# Patient Record
Sex: FEMALE
[De-identification: names, ages, dates, MRNs, and addresses within clinical notes are randomized per-mention and may not be internally consistent; named-entity substitution may affect disease eponyms.]

---

## 2018-09-07 ENCOUNTER — HOSPITAL ENCOUNTER (OUTPATIENT)
Dept: HOSPITAL 88 - CT | Age: 53
End: 2018-09-07
Attending: FAMILY MEDICINE
Payer: COMMERCIAL

## 2018-09-07 DIAGNOSIS — R17: ICD-10-CM

## 2018-09-07 DIAGNOSIS — R10.11: Primary | ICD-10-CM

## 2018-09-07 DIAGNOSIS — R74.8: ICD-10-CM

## 2018-09-07 PROCEDURE — 74177 CT ABD & PELVIS W/CONTRAST: CPT

## 2018-09-07 NOTE — DIAGNOSTIC IMAGING REPORT
EXAMINATION: CT of the abdomen and pelvis with contrast.



TECHNIQUE: 

Spiral CT images of the abdomen and pelvis were performed from the lung bases

to the lesser trochanters after the intravenous administration of 100 cc of

Isovue-370 and the oral administration of water.  Coronal and sagittal

reformatted images were obtained.



COMPARISON:  None.



CLINICAL HISTORY:Jaundice, right upper quadrant pain, elevated liver function

tests

     

DISCUSSION:



ABDOMEN/PELVIS:



LOWER THORAX:Bibasilar linear opacity compatible with subsegmental atelectasis.

Focal scar in the inferior lingular and medial segment of the left lower lobe.

Trace left pleural effusion.



HEPATOBILIARY: Hepatic parenchyma is diffusely hypoattenuating compatible with

steatosis. No focal hepatic lesion or intrahepatic biliary ductal dilatation.

Status post cholecystectomy.           



SPLEEN: No splenomegaly.



PANCREAS: No focal masses or ductal dilatation. 



ADRENALS: No adrenal nodules.



KIDNEYS/URETERS: Subcentimeter hypoattenuating lesion in the left kidney is too

small to further characterize though likely to represent a small cyst. No

calculi or hydronephrosis.



PELVIC ORGANS/BLADDER: The uterus is anteflexed and appears normal. Left

ovarian follicle. Urinary bladder is collapsed and poorly evaluated.



PERITONEUM/RETROPERITONEUM: No ascites. No pneumoperitoneum.



LYMPH NODES: Enlarged cabrera hepatis lymph node measures 1.6 cm. Enlarged

portacaval lymph node measures 1.5 cm. No pelvic sidewall, retroperitoneal, or

mesenteric lymphadenopathy.



VESSELS: Abdominal aorta, major branch vessels, and iliac arterial systems are

well-visualized and patent. Portal vein, splenic vein, and central superior

mesenteric vein are patent.



GI TRACT: The large bowel shows no evidence of distention or wall thickening.

There are a few diverticula scattered along the course of the sigmoid colon.

Focal wall thickening and adjacent stranding involving the mid sigmoid seen on

series 2 image 73. The stomach is collapsed with prominence of the rugal folds.

No small bowel dilatation to suggest obstruction.



BONES AND SOFT TISSUE: No osseous destructive lesions. No focal soft tissue

abnormalities. Probable dropped surgical clip from cholecystectomy in the

midline low pelvis.     



IMPRESSION: 



Hepatic hypoattenuation suggestive of steatosis. Mildly enlarged cabrera hepatis

and portacaval lymph nodes are nonspecific though may be seen in the setting of

hepatitis.



Bibasal pulmonary scar with trace left pleural effusion.



Focal wall thickening with adjacent fat stranding along the mid sigmoid colon

may reflect focal mild diverticulitis, though a mass lesion is an additional

consideration. Correlate with clinical symptoms, and direct visualization may

be of benefit for further evaluation.



Multiple attempts were made to contact ISAAC Esparza at [phone] to discuss the

findings as requested, without success.



Signed by: Dr. hSamar Olmos M.D. on 9/7/2018 11:26 AM